# Patient Record
Sex: FEMALE | Race: WHITE | HISPANIC OR LATINO | Employment: UNEMPLOYED | URBAN - METROPOLITAN AREA
[De-identification: names, ages, dates, MRNs, and addresses within clinical notes are randomized per-mention and may not be internally consistent; named-entity substitution may affect disease eponyms.]

---

## 2023-01-13 ENCOUNTER — HOSPITAL ENCOUNTER (EMERGENCY)
Facility: HOSPITAL | Age: 73
Discharge: HOME | End: 2023-01-14
Attending: EMERGENCY MEDICINE | Admitting: EMERGENCY MEDICINE
Payer: MEDICARE

## 2023-01-13 DIAGNOSIS — R51.9 ACUTE NONINTRACTABLE HEADACHE, UNSPECIFIED HEADACHE TYPE: ICD-10-CM

## 2023-01-13 DIAGNOSIS — I10 PRIMARY HYPERTENSION: Primary | ICD-10-CM

## 2023-01-13 PROCEDURE — 93005 ELECTROCARDIOGRAM TRACING: CPT | Performed by: EMERGENCY MEDICINE

## 2023-01-13 RX ORDER — HYDRALAZINE HYDROCHLORIDE 25 MG/1
25 TABLET, FILM COATED ORAL 3 TIMES DAILY
COMMUNITY

## 2023-01-13 RX ORDER — METHOCARBAMOL 500 MG/1
500 TABLET, FILM COATED ORAL DAILY
COMMUNITY

## 2023-01-13 RX ORDER — CHLORAL HYDRATE 500 MG
1000 CAPSULE ORAL
COMMUNITY

## 2023-01-13 RX ORDER — METOPROLOL TARTRATE 100 MG/1
150 TABLET ORAL DAILY
COMMUNITY

## 2023-01-13 RX ORDER — SODIUM CHLORIDE 0.9 % (FLUSH) 0.9 %
10 SYRINGE (ML) INJECTION AS NEEDED
Status: DISCONTINUED | OUTPATIENT
Start: 2023-01-13 | End: 2023-01-14 | Stop reason: HOSPADM

## 2023-01-14 VITALS
OXYGEN SATURATION: 97 % | WEIGHT: 160 LBS | TEMPERATURE: 98.2 F | DIASTOLIC BLOOD PRESSURE: 100 MMHG | BODY MASS INDEX: 31.41 KG/M2 | HEIGHT: 60 IN | HEART RATE: 57 BPM | SYSTOLIC BLOOD PRESSURE: 195 MMHG | RESPIRATION RATE: 18 BRPM

## 2023-01-14 LAB
ANION GAP SERPL CALCULATED.3IONS-SCNC: 12.4 MMOL/L (ref 5–15)
BASOPHILS # BLD AUTO: 0.04 10*3/MM3 (ref 0–0.2)
BASOPHILS NFR BLD AUTO: 0.5 % (ref 0–1.5)
BUN SERPL-MCNC: 18 MG/DL (ref 8–23)
BUN/CREAT SERPL: 24.7 (ref 7–25)
CALCIUM SPEC-SCNC: 8.5 MG/DL (ref 8.6–10.5)
CHLORIDE SERPL-SCNC: 107 MMOL/L (ref 98–107)
CO2 SERPL-SCNC: 23.6 MMOL/L (ref 22–29)
CREAT SERPL-MCNC: 0.73 MG/DL (ref 0.57–1)
DEPRECATED RDW RBC AUTO: 40 FL (ref 37–54)
EGFRCR SERPLBLD CKD-EPI 2021: 86.4 ML/MIN/1.73
EOSINOPHIL # BLD AUTO: 0.23 10*3/MM3 (ref 0–0.4)
EOSINOPHIL NFR BLD AUTO: 3.1 % (ref 0.3–6.2)
ERYTHROCYTE [DISTWIDTH] IN BLOOD BY AUTOMATED COUNT: 13.2 % (ref 12.3–15.4)
GLUCOSE SERPL-MCNC: 106 MG/DL (ref 65–99)
HCT VFR BLD AUTO: 37 % (ref 34–46.6)
HGB BLD-MCNC: 12.4 G/DL (ref 12–15.9)
IMM GRANULOCYTES # BLD AUTO: 0.02 10*3/MM3 (ref 0–0.05)
IMM GRANULOCYTES NFR BLD AUTO: 0.3 % (ref 0–0.5)
LYMPHOCYTES # BLD AUTO: 2.58 10*3/MM3 (ref 0.7–3.1)
LYMPHOCYTES NFR BLD AUTO: 35.1 % (ref 19.6–45.3)
MCH RBC QN AUTO: 27.9 PG (ref 26.6–33)
MCHC RBC AUTO-ENTMCNC: 33.5 G/DL (ref 31.5–35.7)
MCV RBC AUTO: 83.1 FL (ref 79–97)
MONOCYTES # BLD AUTO: 0.65 10*3/MM3 (ref 0.1–0.9)
MONOCYTES NFR BLD AUTO: 8.9 % (ref 5–12)
NEUTROPHILS NFR BLD AUTO: 3.82 10*3/MM3 (ref 1.7–7)
NEUTROPHILS NFR BLD AUTO: 52.1 % (ref 42.7–76)
NRBC BLD AUTO-RTO: 0 /100 WBC (ref 0–0.2)
PLATELET # BLD AUTO: 231 10*3/MM3 (ref 140–450)
PMV BLD AUTO: 11.9 FL (ref 6–12)
POTASSIUM SERPL-SCNC: 4 MMOL/L (ref 3.5–5.2)
RBC # BLD AUTO: 4.45 10*6/MM3 (ref 3.77–5.28)
SODIUM SERPL-SCNC: 143 MMOL/L (ref 136–145)
TROPONIN T SERPL-MCNC: <0.01 NG/ML (ref 0–0.03)
WBC NRBC COR # BLD: 7.34 10*3/MM3 (ref 3.4–10.8)

## 2023-01-14 PROCEDURE — 85025 COMPLETE CBC W/AUTO DIFF WBC: CPT | Performed by: EMERGENCY MEDICINE

## 2023-01-14 PROCEDURE — 84484 ASSAY OF TROPONIN QUANT: CPT | Performed by: EMERGENCY MEDICINE

## 2023-01-14 PROCEDURE — 99284 EMERGENCY DEPT VISIT MOD MDM: CPT

## 2023-01-14 PROCEDURE — 80048 BASIC METABOLIC PNL TOTAL CA: CPT | Performed by: EMERGENCY MEDICINE

## 2023-01-14 PROCEDURE — 93010 ELECTROCARDIOGRAM REPORT: CPT | Performed by: INTERNAL MEDICINE

## 2023-01-14 PROCEDURE — 36415 COLL VENOUS BLD VENIPUNCTURE: CPT

## 2023-01-14 RX ORDER — HYDROCHLOROTHIAZIDE 12.5 MG/1
12.5 TABLET ORAL DAILY
Qty: 30 TABLET | Refills: 0 | Status: SHIPPED | OUTPATIENT
Start: 2023-01-14 | End: 2023-02-13

## 2023-01-14 RX ORDER — HYDROCHLOROTHIAZIDE 12.5 MG/1
12.5 TABLET ORAL ONCE
Status: COMPLETED | OUTPATIENT
Start: 2023-01-14 | End: 2023-01-14

## 2023-01-14 RX ADMIN — HYDROCHLOROTHIAZIDE 12.5 MG: 12.5 TABLET ORAL at 03:05

## 2023-01-14 NOTE — ED NOTES
Pt arrived by car reporting elevated blood pressure since today. Pt reports no chest pain with HTN. Pt takes Metoprolol 150mg daily.

## 2023-01-14 NOTE — ED PROVIDER NOTES
EMERGENCY DEPARTMENT ENCOUNTER    Room Number:  17/17  Date seen:  1/14/2023  PCP: Provider, No Known      HPI:  Chief Complaint: High blood pressure  A complete HPI/ROS/PMH/PSH/SH/FH are unobtainable due to: None  Context: Gigi Gore is a 72 y.o. female who presents to the ED c/o high blood pressure.  She reports having history of chronic high blood pressure.  She has been noticing it be particular high over the past 1 to 2 weeks.  Today was as high as 240 systolic when she checked in the emergency department.  It was running closer to 200 and at home.  She reports having mild headache this afternoon to the front of her head.  She no longer has a headache at this time.  She is on metoprolol 150 mg daily as well as hydralazine 25 mg 3 times a day.  No chest pain or shortness of breath.          PAST MEDICAL HISTORY  Active Ambulatory Problems     Diagnosis Date Noted   • No Active Ambulatory Problems     Resolved Ambulatory Problems     Diagnosis Date Noted   • No Resolved Ambulatory Problems     No Additional Past Medical History         PAST SURGICAL HISTORY  No past surgical history on file.      FAMILY HISTORY  No family history on file.      SOCIAL HISTORY  Social History     Socioeconomic History   • Marital status:          ALLERGIES  Patient has no known allergies.        REVIEW OF SYSTEMS  Review of Systems     All systems reviewed and negative except for those discussed in HPI.       PHYSICAL EXAM  ED Triage Vitals   Temp Heart Rate Resp BP SpO2   01/13/23 2306 01/13/23 2300 01/13/23 2300 01/13/23 2300 01/13/23 2300   98.2 °F (36.8 °C) 68 16 (!) 242/130 97 %      Temp src Heart Rate Source Patient Position BP Location FiO2 (%)   01/13/23 2306 01/13/23 2300 01/13/23 2300 01/13/23 2300 --   Oral Monitor Standing Right arm        Physical Exam      GENERAL: no acute distress  HENT: nares patent  EYES: no scleral icterus  CV: regular rhythm, normal rate  RESPIRATORY: normal effort, clear  to auscultation bilaterally  ABDOMEN: soft, nontender  MUSCULOSKELETAL: no deformity  NEURO:   Recent and remote memory functions are normal. The patient is attentive with normal concentration. Language is fluent. Speech is clear. The speech is non-dysarthric. Fund of knowledge is normal.   Symmetric smile with no facial droop.  Eyes close shut strongly bilaterally.  Symmetric eyebrow raise bilaterally.  EOMI, PERRL  CN II-XII grossly normal otherwise.  5/5 strength to extremities.  No pronator drift.  Intact FNF.  No meningismus.  PSYCH:  calm, cooperative  SKIN: warm, dry    Vital signs and nursing notes reviewed.          LAB RESULTS  Recent Results (from the past 24 hour(s))   Basic Metabolic Panel    Collection Time: 01/14/23 12:44 AM    Specimen: Blood   Result Value Ref Range    Glucose 106 (H) 65 - 99 mg/dL    BUN 18 8 - 23 mg/dL    Creatinine 0.73 0.57 - 1.00 mg/dL    Sodium 143 136 - 145 mmol/L    Potassium 4.0 3.5 - 5.2 mmol/L    Chloride 107 98 - 107 mmol/L    CO2 23.6 22.0 - 29.0 mmol/L    Calcium 8.5 (L) 8.6 - 10.5 mg/dL    BUN/Creatinine Ratio 24.7 7.0 - 25.0    Anion Gap 12.4 5.0 - 15.0 mmol/L    eGFR 86.4 >60.0 mL/min/1.73   Troponin    Collection Time: 01/14/23 12:44 AM    Specimen: Blood   Result Value Ref Range    Troponin T <0.010 0.000 - 0.030 ng/mL   CBC Auto Differential    Collection Time: 01/14/23 12:44 AM    Specimen: Blood   Result Value Ref Range    WBC 7.34 3.40 - 10.80 10*3/mm3    RBC 4.45 3.77 - 5.28 10*6/mm3    Hemoglobin 12.4 12.0 - 15.9 g/dL    Hematocrit 37.0 34.0 - 46.6 %    MCV 83.1 79.0 - 97.0 fL    MCH 27.9 26.6 - 33.0 pg    MCHC 33.5 31.5 - 35.7 g/dL    RDW 13.2 12.3 - 15.4 %    RDW-SD 40.0 37.0 - 54.0 fl    MPV 11.9 6.0 - 12.0 fL    Platelets 231 140 - 450 10*3/mm3    Neutrophil % 52.1 42.7 - 76.0 %    Lymphocyte % 35.1 19.6 - 45.3 %    Monocyte % 8.9 5.0 - 12.0 %    Eosinophil % 3.1 0.3 - 6.2 %    Basophil % 0.5 0.0 - 1.5 %    Immature Grans % 0.3 0.0 - 0.5 %     Neutrophils, Absolute 3.82 1.70 - 7.00 10*3/mm3    Lymphocytes, Absolute 2.58 0.70 - 3.10 10*3/mm3    Monocytes, Absolute 0.65 0.10 - 0.90 10*3/mm3    Eosinophils, Absolute 0.23 0.00 - 0.40 10*3/mm3    Basophils, Absolute 0.04 0.00 - 0.20 10*3/mm3    Immature Grans, Absolute 0.02 0.00 - 0.05 10*3/mm3    nRBC 0.0 0.0 - 0.2 /100 WBC   ECG 12 Lead Other; hypertension    Collection Time: 01/14/23  2:33 AM   Result Value Ref Range    QT Interval 435 ms       Ordered the above labs and reviewed the results.        RADIOLOGY  No Radiology Exams Resulted Within Past 24 Hours    Ordered the above noted radiological studies. Reviewed by me in PACS.          PROCEDURES  Procedures          MEDICATIONS GIVEN IN ER  Medications   sodium chloride 0.9 % flush 10 mL (has no administration in time range)   hydroCHLOROthiazide (HYDRODIURIL) tablet 12.5 mg (has no administration in time range)           MEDICAL DECISION MAKING, PROGRESS, and CONSULTS    All labs have been independently reviewed by me.  All radiology studies have been reviewed by me and discussed with radiologist dictating the report.   EKG's independently viewed and interpreted by me.  Discussion below represents my analysis of pertinent findings related to patient's condition, differential diagnosis, treatment plan and final disposition.      Orders placed during this visit:  Orders Placed This Encounter   Procedures   • Basic Metabolic Panel   • Troponin   • CBC Auto Differential   • Monitor Blood Pressure   • Cardiac Monitoring   • Pulse Oximetry, Continuous   • Vital Signs Recheck   • ECG 12 Lead Other; hypertension   • Insert Peripheral IV   • CBC & Differential         Additional sources:  - Discussed/obtained information from independent historians: Family at bedside  Additional information was obtained to confirm the patient's history.          Differential diagnosis:    Patient presents with asymptomatic hypertension at this time.  I have low suspicion for  cardiac disease especially given her EKG and negative troponin.  She had a headache that has now resolved.      She has a normal neurological examination.  I do not believe that she needs a CT scan of her head which is consistent with AHA guidelines.      I did check her blood work to evaluate her creatinine and potassium levels.  She has been on amlodipine before and did not tolerate it which is why I am prescribing her hydrochlorothiazide.      Independent interpretation of labs, radiology studies, and discussions with consultants:  ED Course as of 01/14/23 0241   Sat Jan 14, 2023   0135 WBC: 7.34 [TD]   0135 Hemoglobin: 12.4 [TD]   0154 Troponin T: <0.010 [TD]   0154 Creatinine: 0.73 [TD]   0154 Sodium: 143 [TD]   0154 Potassium: 4.0 [TD]   0234 EKG interpreted by myself.  Time 2:33 AM.  Sinus rhythm.  Heart rate 58.  First-degree block.  Normal axis.  No acute ST abnormality. [TD]      ED Course User Index  [TD] Rod Ahmadi II, MD     I did consider hospitalization for the patient as she was very hypertensive upon arrival to the emergency department.  However, blood pressure is improved considerably.  She is asymptomatic.  Per American Heart Association guidance, she does not need to be admitted to the hospital in the situation.  I am starting her on a new blood pressure medication regimen.  She will follow-up closely with her cardiologist and PCP as she will likely need further titration.  I discussed with her return precautions in detail.         PPE: The patient wore a mask throughout the entire encounter. I wore a well-fitting mask.    DIAGNOSIS  Final diagnoses:   Primary hypertension   Acute nonintractable headache, unspecified headache type         DISPOSITION  DISCHARGE    FOLLOW-UP  [facility] Emergency Department  [address] [facility] [address] [phone]  Go to   If symptoms worsen      Wendy dorota delmis con frey cardiologo en 1 semana             Medication List       New Prescriptions    hydroCHLOROthiazide 12.5 MG tablet  Commonly known as: HYDRODIURIL  Take 1 tablet by mouth Daily for 30 days.           Where to Get Your Medications      You can get these medications from any pharmacy    Bring a paper prescription for each of these medications  · hydroCHLOROthiazide 12.5 MG tablet             Latest Documented Vital Signs:  As of 02:41 EST  BP- (!) 187/97 HR- 53 Temp- 98.2 °F (36.8 °C) (Oral) O2 sat- 97%      --    Please note that portions of this were completed with a voice recognition program.       Note Disclaimer: At [facility], we believe that sharing information builds trust and better relationships. You are receiving this note because you are receiving care at [facility] or recently visited. It is possible you will see health information before a provider has talked with you about it. This kind of information can be easy to misunderstand. To help you fully understand what it means for your health, we urge you to discuss this note with your provider.       Rod Ahmadi II, MD  01/14/23 0770

## 2023-01-15 LAB — QT INTERVAL: 435 MS

## 2025-02-06 ENCOUNTER — OFFICE (OUTPATIENT)
Dept: URBAN - METROPOLITAN AREA CLINIC 94 | Facility: CLINIC | Age: 75
End: 2025-02-06
Payer: MEDICARE

## 2025-02-06 VITALS
WEIGHT: 175 LBS | DIASTOLIC BLOOD PRESSURE: 76 MMHG | OXYGEN SATURATION: 96 % | HEIGHT: 56 IN | DIASTOLIC BLOOD PRESSURE: 78 MMHG | SYSTOLIC BLOOD PRESSURE: 128 MMHG | HEART RATE: 68 BPM | SYSTOLIC BLOOD PRESSURE: 130 MMHG

## 2025-02-06 DIAGNOSIS — R13.10 DYSPHAGIA, UNSPECIFIED: ICD-10-CM

## 2025-02-06 DIAGNOSIS — B96.81 HELICOBACTER PYLORI [H. PYLORI] AS THE CAUSE OF DISEASES CLA: ICD-10-CM

## 2025-02-06 DIAGNOSIS — K59.00 CONSTIPATION, UNSPECIFIED: ICD-10-CM

## 2025-02-06 DIAGNOSIS — K21.9 GASTRO-ESOPHAGEAL REFLUX DISEASE WITHOUT ESOPHAGITIS: ICD-10-CM

## 2025-02-06 PROCEDURE — 99204 OFFICE O/P NEW MOD 45 MIN: CPT | Performed by: INTERNAL MEDICINE

## 2025-02-06 RX ORDER — OMEPRAZOLE 40 MG/1
40 CAPSULE, DELAYED RELEASE ORAL
Qty: 90 | Refills: 3 | Status: ACTIVE
Start: 2025-02-06

## 2025-06-25 ENCOUNTER — OFFICE (OUTPATIENT)
Dept: URBAN - METROPOLITAN AREA CLINIC 76 | Facility: CLINIC | Age: 75
End: 2025-06-25
Payer: MEDICARE

## 2025-06-25 VITALS
SYSTOLIC BLOOD PRESSURE: 128 MMHG | DIASTOLIC BLOOD PRESSURE: 74 MMHG | OXYGEN SATURATION: 96 % | HEART RATE: 44 BPM | WEIGHT: 173 LBS | HEIGHT: 56 IN

## 2025-06-25 DIAGNOSIS — K59.00 CONSTIPATION, UNSPECIFIED: ICD-10-CM

## 2025-06-25 DIAGNOSIS — R13.10 DYSPHAGIA, UNSPECIFIED: ICD-10-CM

## 2025-06-25 DIAGNOSIS — K21.9 GASTRO-ESOPHAGEAL REFLUX DISEASE WITHOUT ESOPHAGITIS: ICD-10-CM

## 2025-06-25 PROCEDURE — 99213 OFFICE O/P EST LOW 20 MIN: CPT

## 2025-06-25 RX ORDER — SENNOSIDES 8.6 MG/1
17.2 TABLET, FILM COATED ORAL
Qty: 60 | Refills: 4 | Status: ACTIVE
Start: 2025-06-25